# Patient Record
Sex: MALE | Race: WHITE | NOT HISPANIC OR LATINO | Employment: FULL TIME | URBAN - METROPOLITAN AREA
[De-identification: names, ages, dates, MRNs, and addresses within clinical notes are randomized per-mention and may not be internally consistent; named-entity substitution may affect disease eponyms.]

---

## 2023-03-13 ENCOUNTER — HOSPITAL ENCOUNTER (EMERGENCY)
Facility: HOSPITAL | Age: 43
Discharge: HOME | End: 2023-03-13
Attending: EMERGENCY MEDICINE

## 2023-03-13 VITALS
DIASTOLIC BLOOD PRESSURE: 102 MMHG | TEMPERATURE: 97.9 F | HEART RATE: 137 BPM | RESPIRATION RATE: 20 BRPM | SYSTOLIC BLOOD PRESSURE: 157 MMHG | OXYGEN SATURATION: 94 %

## 2023-03-13 DIAGNOSIS — R60.9 PERIPHERAL EDEMA: Primary | ICD-10-CM

## 2023-03-13 DIAGNOSIS — R00.0 TACHYCARDIA: ICD-10-CM

## 2023-03-13 DIAGNOSIS — R03.0 ELEVATED BLOOD PRESSURE READING: ICD-10-CM

## 2023-03-13 LAB
ALBUMIN SERPL BCP-MCNC: 4.3 G/DL (ref 3.5–5)
ALP SERPL-CCNC: 66 U/L (ref 46–116)
ALT SERPL W P-5'-P-CCNC: 39 U/L (ref 12–78)
ANION GAP SERPL CALCULATED.3IONS-SCNC: 3 MMOL/L (ref 4–13)
AST SERPL W P-5'-P-CCNC: 38 U/L (ref 5–45)
ATRIAL RATE: 99 BPM
BASOPHILS # BLD AUTO: 0.12 THOUSANDS/ÂΜL (ref 0–0.1)
BASOPHILS NFR BLD AUTO: 1 % (ref 0–1)
BILIRUB SERPL-MCNC: 0.96 MG/DL (ref 0.2–1)
BUN SERPL-MCNC: 6 MG/DL (ref 5–25)
CALCIUM SERPL-MCNC: 9.8 MG/DL (ref 8.3–10.1)
CARDIAC TROPONIN I PNL SERPL HS: 8 NG/L
CHLORIDE SERPL-SCNC: 102 MMOL/L (ref 96–108)
CK MB SERPL-MCNC: 1.1 % (ref 0–2.5)
CK MB SERPL-MCNC: 7 NG/ML (ref 0–5)
CK SERPL-CCNC: 659 U/L (ref 39–308)
CO2 SERPL-SCNC: 31 MMOL/L (ref 21–32)
CREAT SERPL-MCNC: 1.22 MG/DL (ref 0.6–1.3)
EOSINOPHIL # BLD AUTO: 0.3 THOUSAND/ÂΜL (ref 0–0.61)
EOSINOPHIL NFR BLD AUTO: 3 % (ref 0–6)
ERYTHROCYTE [DISTWIDTH] IN BLOOD BY AUTOMATED COUNT: 13.1 % (ref 11.6–15.1)
GFR SERPL CREATININE-BSD FRML MDRD: 73 ML/MIN/1.73SQ M
GLUCOSE SERPL-MCNC: 102 MG/DL (ref 65–140)
HCT VFR BLD AUTO: 48.8 % (ref 36.5–49.3)
HGB BLD-MCNC: 16.2 G/DL (ref 12–17)
IMM GRANULOCYTES # BLD AUTO: 0.01 THOUSAND/UL (ref 0–0.2)
IMM GRANULOCYTES NFR BLD AUTO: 0 % (ref 0–2)
LYMPHOCYTES # BLD AUTO: 2.62 THOUSANDS/ÂΜL (ref 0.6–4.47)
LYMPHOCYTES NFR BLD AUTO: 30 % (ref 14–44)
MCH RBC QN AUTO: 32 PG (ref 26.8–34.3)
MCHC RBC AUTO-ENTMCNC: 33.2 G/DL (ref 31.4–37.4)
MCV RBC AUTO: 96 FL (ref 82–98)
MONOCYTES # BLD AUTO: 0.66 THOUSAND/ÂΜL (ref 0.17–1.22)
MONOCYTES NFR BLD AUTO: 8 % (ref 4–12)
NEUTROPHILS # BLD AUTO: 5.11 THOUSANDS/ÂΜL (ref 1.85–7.62)
NEUTS SEG NFR BLD AUTO: 58 % (ref 43–75)
NRBC BLD AUTO-RTO: 0 /100 WBCS
NT-PROBNP SERPL-MCNC: 110 PG/ML
P AXIS: 50 DEGREES
PLATELET # BLD AUTO: 353 THOUSANDS/UL (ref 149–390)
PMV BLD AUTO: 9.7 FL (ref 8.9–12.7)
POTASSIUM SERPL-SCNC: 3.9 MMOL/L (ref 3.5–5.3)
PR INTERVAL: 124 MS
PROT SERPL-MCNC: 7.4 G/DL (ref 6.4–8.4)
QRS AXIS: 57 DEGREES
QRSD INTERVAL: 90 MS
QT INTERVAL: 324 MS
QTC INTERVAL: 415 MS
RBC # BLD AUTO: 5.07 MILLION/UL (ref 3.88–5.62)
SODIUM SERPL-SCNC: 136 MMOL/L (ref 135–147)
T WAVE AXIS: -7 DEGREES
TSH SERPL DL<=0.05 MIU/L-ACNC: 2.26 UIU/ML (ref 0.45–4.5)
VENTRICULAR RATE: 99 BPM
WBC # BLD AUTO: 8.82 THOUSAND/UL (ref 4.31–10.16)

## 2023-03-13 NOTE — ED PROVIDER NOTES
History  Chief Complaint   Patient presents with   • Leg Swelling   • Hand Swelling     Patient c/o b/l hand and lower leg swelling and redness for past 2 days  States he is on hormone replacement and thyroid medication that he hasn't been taking for a few weeks and just began taking them again  Denies CP/SOB  45-year-old male patient with history of hypothyroid, decreased testosterone, presenting with bilateral hand and bilateral lower extremity swelling onset 2 days ago  Patient also has redness in his fingers  States has been mildly worsening  Denies pain to hands and legs  Patient was recently started on levothyroxine for his hypothyroidism  Patient also having palpitations today and chest pain yesterday  Patient states that he was recently started on psychiatric medications  Denies any fevers, abdominal pain, nausea, vomiting, diarrhea, urinary symptoms  Prior to Admission Medications   Prescriptions Last Dose Informant Patient Reported? Taking? ALPRAZolam (XANAX) 2 MG tablet   Yes No   Sig: Take 2 mg by mouth daily at bedtime as needed for anxiety   Amphetamine-Dextroamphetamine (ADDERALL PO)   Yes No   Sig: Take by mouth   TESTOSTERONE IM   Yes No   Sig: Inject into the shoulder, thigh, or buttocks      Facility-Administered Medications: None       History reviewed  No pertinent past medical history  History reviewed  No pertinent surgical history  History reviewed  No pertinent family history  I have reviewed and agree with the history as documented  E-Cigarette/Vaping     E-Cigarette/Vaping Substances     Social History     Tobacco Use   • Smoking status: Never   Substance Use Topics   • Alcohol use: No   • Drug use: No        Review of Systems   Cardiovascular: Positive for chest pain, palpitations and leg swelling (Bilateral lower extremity swelling, upper extremity swelling)  All other systems reviewed and are negative        Physical Exam  ED Triage Vitals   Temperature Pulse Respirations Blood Pressure SpO2   03/13/23 0242 03/13/23 0240 03/13/23 0240 03/13/23 0240 03/13/23 0240   97 9 °F (36 6 °C) (!) 137 20 (!) 157/102 94 %      Temp Source Heart Rate Source Patient Position - Orthostatic VS BP Location FiO2 (%)   03/13/23 0242 03/13/23 0240 -- -- --   Oral Monitor         Pain Score       03/13/23 0240       No Pain             Orthostatic Vital Signs  Vitals:    03/13/23 0240   BP: (!) 157/102   Pulse: (!) 137       Physical Exam  Vitals reviewed  Constitutional:       Appearance: Normal appearance  HENT:      Head: Normocephalic and atraumatic  Nose: Nose normal       Mouth/Throat:      Mouth: Mucous membranes are moist       Pharynx: Oropharynx is clear  Eyes:      Extraocular Movements: Extraocular movements intact  Conjunctiva/sclera: Conjunctivae normal    Cardiovascular:      Rate and Rhythm: Regular rhythm  Tachycardia present  Pulses: Normal pulses  Heart sounds: Normal heart sounds  Pulmonary:      Effort: Pulmonary effort is normal       Breath sounds: Normal breath sounds  Abdominal:      General: Bowel sounds are normal       Palpations: Abdomen is soft  Tenderness: There is no abdominal tenderness  Musculoskeletal:         General: Normal range of motion  Cervical back: Normal range of motion  Right lower leg: Edema (2+ pitting edema) present  Left lower leg: Edema (2+ pitting edema) present  Comments: Mild swelling to bilateral upper extremities with some erythema to the distal tips inferior to fingernails   Skin:     General: Skin is warm and dry  Neurological:      General: No focal deficit present  Mental Status: He is alert and oriented to person, place, and time  Mental status is at baseline           ED Medications  Medications - No data to display    Diagnostic Studies  Results Reviewed     Procedure Component Value Units Date/Time    TSH, 3rd generation with Free T4 reflex [[id number]]  (Normal) Collected: 03/13/23 0425    Lab Status: Final result Specimen: Blood from Arm, Right Updated: 03/13/23 0553     TSH 3RD GENERATON 2 260 uIU/mL     Narrative:      Patients undergoing fluorescein dye angiography may retain small amounts of fluorescein in the body for 48-72 hours post procedure  Samples containing fluorescein can produce falsely depressed TSH values  If the patient had this procedure,a specimen should be resubmitted post fluorescein clearance        NT-BNP PRO-BE,[facility] only           [[id number]]  (Normal) Collected: 03/13/23 0425    Lab Status: Final result Specimen: Blood from Arm, Right Updated: 03/13/23 0553     NT-proBNP 110 pg/mL     CKMB [[id number]]  (Abnormal) Collected: 03/13/23 0425    Lab Status: Final result Specimen: Blood from Arm, Right Updated: 03/13/23 0525     CK-MB Index 1 1 %      CK-MB 7 0 ng/mL     HS Troponin 0hr (reflex protocol) [[id number]]  (Normal) Collected: 03/13/23 0425    Lab Status: Final result Specimen: Blood from Arm, Right Updated: 03/13/23 0510     hs TnI 0hr 8 ng/L     CK (with reflex to MB) [[id number]]  (Abnormal) Collected: 03/13/23 0425    Lab Status: Final result Specimen: Blood from Arm, Right Updated: 03/13/23 0509     Total  U/L     Comprehensive metabolic panel [[id number]]  (Abnormal) Collected: 03/13/23 0425    Lab Status: Final result Specimen: Blood from Arm, Right Updated: 03/13/23 0504     Sodium 136 mmol/L      Potassium 3 9 mmol/L      Chloride 102 mmol/L      CO2 31 mmol/L      ANION GAP 3 mmol/L      BUN 6 mg/dL      Creatinine 1 22 mg/dL      Glucose 102 mg/dL      Calcium 9 8 mg/dL      AST 38 U/L      ALT 39 U/L      Alkaline Phosphatase 66 U/L      Total Protein 7 4 g/dL      Albumin 4 3 g/dL      Total Bilirubin 0 96 mg/dL      eGFR 73 ml/min/1 73sq m     Narrative:      [place] guidelines for Chronic Kidney Disease (CKD):   •  Stage 1 with normal or high GFR (GFR > 90 mL/min/1 73 square meters)  •  Stage 2 Mild CKD (GFR = 60-89 mL/min/1 73 square meters)  •  Stage 3A Moderate CKD (GFR = 45-59 mL/min/1 73 square meters)  •  Stage 3B Moderate CKD (GFR = 30-44 mL/min/1 73 square meters)  •  Stage 4 Severe CKD (GFR = 15-29 mL/min/1 73 square meters)  •  Stage 5 End Stage CKD (GFR <15 mL/min/1 73 square meters)  Note: GFR calculation is accurate only with a steady state creatinine    CBC and differential [[id number]]  (Abnormal) Collected: 03/13/23 0425    Lab Status: Final result Specimen: Blood from Arm, Right Updated: 03/13/23 0439     WBC 8 82 Thousand/uL      RBC 5 07 Million/uL      Hemoglobin 16 2 g/dL      Hematocrit 48 8 %      MCV 96 fL      MCH 32 0 pg      MCHC 33 2 g/dL      RDW 13 1 %      MPV 9 7 fL      Platelets 293 Thousands/uL      nRBC 0 /100 WBCs      Neutrophils Relative 58 %      Immat GRANS % 0 %      Lymphocytes Relative 30 %      Monocytes Relative 8 %      Eosinophils Relative 3 %      Basophils Relative 1 %      Neutrophils Absolute 5 11 Thousands/µL      Immature Grans Absolute 0 01 Thousand/uL      Lymphocytes Absolute 2 62 Thousands/µL      Monocytes Absolute 0 66 Thousand/µL      Eosinophils Absolute 0 30 Thousand/µL      Basophils Absolute 0 12 Thousands/µL                  No orders to display         Procedures  Procedures      ED Course  ED Course as of 03/14/23 1755   Mon Mar 13, 2023   3519 EKG: normal sinus rhythm  Twave inversion in lead III                                         Medical Decision Making  41 y/o male patient presenting with BLE swelling and BUE swelling  Patien thas some erythema to distal digits  Patient also has episode of palpitations and chest pain yesterday  Exam shows edema to BLE and BUE  Tachycardia  Possible ddx includes thyroid abnormality, medication reaction, idiopathic, myositis  Labs WNL, CK midly elevated  Patient states he recently worked out  ekg shows tachycardia       Elevated blood pressure reading:     Details: Joy has elevated BP  follow up with PCP  Peripheral edema:     Details: Patient c/o peripheral edema  unknown etiology  labs show mild elevated CK  Tachycardia:     Details: patient with tachycardia, improved in ED  Amount and/or Complexity of Data Reviewed  Labs: ordered  ECG/medicine tests: independent interpretation performed  Details: NSR  NSST changes  Discussion of management or test interpretation with external provider(s): Discussed results with patient  Stable for discharge with follow up with PCP  Return precautions given  Disposition  Final diagnoses:   Peripheral edema   Tachycardia   Elevated blood pressure reading     Time reflects when diagnosis was documented in both MDM as applicable and the Disposition within this note     Time User Action Codes Description Comment    3/13/2023  6:05 AM Rebecca Stair Seok Add [R60 0] Bilateral lower extremity edema     3/13/2023  6:05 AM Rebecca Stair Seok Add [R60 0] Extremity edema     3/13/2023  6:05 AM Rebecca Stair Seok Modify [R60 0] Extremity edema     3/13/2023  6:05 AM Melany Helmili, Skip Grey Seok Remove [R60 0] Bilateral lower extremity edema     3/13/2023  6:05 AM Melany Helmili, Min Seok Remove [R60 0] Extremity edema     3/13/2023  6:05 AM Rebecca Stair Seok Add [R60 9] Peripheral edema     3/13/2023  6:05 AM Melany Krista Skip Grey Seok Add [R00 0] Tachycardia     3/13/2023  6:05 AM Rebecca Stair Seok Add [R03 0] Elevated blood pressure reading       ED Disposition     ED Disposition   Discharge    Condition   Stable    Date/Time   Mon Mar 13, 2023  6:05 AM    Comment   Kingsley Garcia discharge to home/self care                 Follow-up Information    None         Discharge Medication List as of 3/13/2023  6:06 AM      CONTINUE these medications which have NOT CHANGED    Details   ALPRAZolam (XANAX) 2 MG tablet Take 2 mg by mouth daily at bedtime as needed for anxiety, Until Discontinued, Historical Med      Amphetamine-Dextroamphetamine (ADDERALL PO) Take by mouth, Until Discontinued, Historical Med TESTOSTERONE IM Inject into the shoulder, thigh, or buttocks, Until Discontinued, Historical Med           No discharge procedures on file  PDMP Review     None           ED Provider  Attending physically available and evaluated Tomi Tomas I managed the patient along with the ED Attending      Electronically Signed by         Jason Rodriguez MD  03/14/23 6354

## 2023-03-13 NOTE — ED ATTENDING ATTESTATION
Final Diagnoses:     1  Peripheral edema    2  Tachycardia    3  Elevated blood pressure reading      ED Course as of 03/13/23 0619   Mon Mar 13, 2023   0619 Total CK(!): 659   0619 Creatinine: 1 22       I, Kelley Mcknight MD, saw and evaluated the patient  All available labs and X-rays were ordered by me or the resident and have been reviewed by myself  I discussed the patient with the resident / non-physician and agree with the resident's / non-physician practitioner's findings and plan as documented in the resident's / non-physician practicitioner's note, except where noted  At this point, I agree with the current assessment done in the ED  I was present during key portions of all procedures performed unless otherwise stated  Nursing Triage:     Chief Complaint   Patient presents with   • Leg Swelling   • Hand Swelling     Patient c/o b/l hand and lower leg swelling and redness for past 2 days  States he is on hormone replacement and thyroid medication that he hasn't been taking for a few weeks and just began taking them again  Denies CP/SOB  HPI:   This is a 40 y o  male presenting for evaluation of 2 days of bilateral hand and leg selling  Erythema of the LEFT hand mostly under the nails  7 seconds of palpitations with CP yesterday  Hx of hypothyroidism  When younger he used steroids; he doesn't produce Testosterone as well  Takes anti-estrogen medications  Takes adderall, gabapentin  Mild pain in the legs  ASSESSMENT + PLAN:   Will do fluids, labs (CMP for electrolytes, kidney function, looking for transaminitis)  Will check CPK (steroid induced myositis?)  Troponin, EKG, BNP given the transient palpitations, CP  Recent started on levothyroixine 2 weeks ago ? TSH w/ reflex  Physical:   General: VS reviewed  Appears in NAD  awake, alert  Well-nourished, well-developed  Appears stated age  Speaking normally in full sentences     Head: Normocephalic, atraumatic  Eyes: EOM-I  No diplopia  No hyphema  No subconjunctival hemorrhages  Symmetrical lids  ENT: Atraumatic external nose and ears  MMM  No malocclusion  No stridor  Normal phonation  No drooling  Normal swallowing  Neck: No JVD  CV: No pallor noted  Tachycardia HR 130s  Lungs:   No tachypnea  No respiratory distress  Abd: soft nt nd no rebound/guarding  MSK:   FROM spontaneously  Swelling of hte arms and legs, pitting  Skin: Dry, intact  Neuro: Awake, alert, GCS15, CN II-XII grossly intact  Motor grossly intact  Psychiatric/Behavioral: interacting normally; appropriate mood/affect   Exam: deferred    Vitals:    03/13/23 0240 03/13/23 0242   BP: (!) 157/102    Pulse: (!) 137    Resp: 20    Temp:  97 9 °F (36 6 °C)   TempSrc:  Oral   SpO2: 94%      - There are no obvious limitations to social determinants of care  - Nursing note reviewed  - Vitals reviewed  - Orders placed by myself and/or advanced practitioner / resident     - Previous chart was reviewed  - No language barrier    - History obtained from patient  - There are no limitations to the history obtained:     Past Medical:    has no past medical history on file  Past Surgical:    has no past surgical history on file  Social:     Social History     Substance and Sexual Activity   Alcohol Use No     Social History     Tobacco Use   Smoking Status Never   Smokeless Tobacco Not on file     Social History     Substance and Sexual Activity   Drug Use No       Echo:   No results found for this or any previous visit  No results found for this or any previous visit  Cath:    No results found for this or any previous visit        Code Status: No Order  Advance Directive and Living Will:      Power of :    POLST:    Medications - No data to display  No orders to display     Orders Placed This Encounter   Procedures   • CBC and differential   • Comprehensive metabolic panel   • HS Troponin 0hr (reflex protocol)   • TSH, 3rd generation with Free T4 reflex   • CK (with reflex to MB)   • CKMB   • HS Troponin I 2hr   • HS Troponin I 4hr   • NT-BNP PRO-BE,[facility] only             • ECG 12 lead   • ECG 12 lead     Labs Reviewed   CBC AND DIFFERENTIAL - Abnormal       Result Value Ref Range Status    WBC 8 82  4 31 - 10 16 Thousand/uL Final    RBC 5 07  3 88 - 5 62 Million/uL Final    Hemoglobin 16 2  12 0 - 17 0 g/dL Final    Hematocrit 48 8  36 5 - 49 3 % Final    MCV 96  82 - 98 fL Final    MCH 32 0  26 8 - 34 3 pg Final    MCHC 33 2  31 4 - 37 4 g/dL Final    RDW 13 1  11 6 - 15 1 % Final    MPV 9 7  8 9 - 12 7 fL Final    Platelets 388  389 - 390 Thousands/uL Final    nRBC 0  /100 WBCs Final    Neutrophils Relative 58  43 - 75 % Final    Immat GRANS % 0  0 - 2 % Final    Lymphocytes Relative 30  14 - 44 % Final    Monocytes Relative 8  4 - 12 % Final    Eosinophils Relative 3  0 - 6 % Final    Basophils Relative 1  0 - 1 % Final    Neutrophils Absolute 5 11  1 85 - 7 62 Thousands/µL Final    Immature Grans Absolute 0 01  0 00 - 0 20 Thousand/uL Final    Lymphocytes Absolute 2 62  0 60 - 4 47 Thousands/µL Final    Monocytes Absolute 0 66  0 17 - 1 22 Thousand/µL Final    Eosinophils Absolute 0 30  0 00 - 0 61 Thousand/µL Final    Basophils Absolute 0 12 (*) 0 00 - 0 10 Thousands/µL Final   COMPREHENSIVE METABOLIC PANEL - Abnormal    Sodium 136  135 - 147 mmol/L Final    Potassium 3 9  3 5 - 5 3 mmol/L Final    Chloride 102  96 - 108 mmol/L Final    CO2 31  21 - 32 mmol/L Final    ANION GAP 3 (*) 4 - 13 mmol/L Final    BUN 6  5 - 25 mg/dL Final    Creatinine 1 22  0 60 - 1 30 mg/dL Final    Comment: Standardized to IDMS reference method    Glucose 102  65 - 140 mg/dL Final    Comment: If the patient is fasting, the ADA then defines impaired fasting glucose as > 100 mg/dL and diabetes as > or equal to 123 mg/dL  Specimen collection should occur prior to Sulfasalazine administration due to the potential for falsely depressed results  Specimen collection should occur prior to Sulfapyridine administration due to the potential for falsely elevated results  Calcium 9 8  8 3 - 10 1 mg/dL Final    AST 38  5 - 45 U/L Final    Comment: Specimen collection should occur prior to Sulfasalazine administration due to the potential for falsely depressed results  ALT 39  12 - 78 U/L Final    Comment: Specimen collection should occur prior to Sulfasalazine and/or Sulfapyridine administration due to the potential for falsely depressed results  Alkaline Phosphatase 66  46 - 116 U/L Final    Total Protein 7 4  6 4 - 8 4 g/dL Final    Albumin 4 3  3 5 - 5 0 g/dL Final    Total Bilirubin 0 96  0 20 - 1 00 mg/dL Final    Comment: Use of this assay is not recommended for patients undergoing treatment with eltrombopag due to the potential for falsely elevated results  eGFR 73  ml/min/1 73sq m Final    Narrative:     [place] guidelines for Chronic Kidney Disease (CKD):   •  Stage 1 with normal or high GFR (GFR > 90 mL/min/1 73 square meters)  •  Stage 2 Mild CKD (GFR = 60-89 mL/min/1 73 square meters)  •  Stage 3A Moderate CKD (GFR = 45-59 mL/min/1 73 square meters)  •  Stage 3B Moderate CKD (GFR = 30-44 mL/min/1 73 square meters)  •  Stage 4 Severe CKD (GFR = 15-29 mL/min/1 73 square meters)  •  Stage 5 End Stage CKD (GFR <15 mL/min/1 73 square meters)  Note: GFR calculation is accurate only with a steady state creatinine   CK - Abnormal    Total  (*) 39 - 308 U/L Final   CKMB - Abnormal    CK-MB Index 1 1  0 0 - 2 5 % Final    CK-MB 7 0 (*) 0 0 - 5 0 ng/mL Final   HS TROPONIN I 0HR - Normal    hs TnI 0hr 8  "Refer to ACS Flowchart"- see link ng/L Final    Comment:                                              Initial (time 0) result  If >=50 ng/L, Myocardial injury suggested ;  Type of myocardial injury and treatment strategy  to be determined    If 5-49 ng/L, a delta result at 2 hours and or 4 hours will be needed to further evaluate  If <4 ng/L, and chest pain has been >3 hours since onset, patient may qualify for discharge based on the HEART score in the ED  If <5 ng/L and <3hours since onset of chest pain, a delta result at 2 hours will be needed to further evaluate  HS Troponin 99th Percentile URL of a Health Population=12 ng/L with a 95% Confidence Interval of 8-18 ng/L  Second Troponin (time 2 hours)  If calculated delta >= 20 ng/L,  Myocardial injury suggested ; Type of myocardial injury and treatment strategy to be determined  If 5-49 ng/L and the calculated delta is 5-19 ng/L, consult medical service for evaluation  Continue evaluation for ischemia on ecg and other possible etiology and repeat hs troponin at 4 hours  If delta is <5 ng/L at 2 hours, consider discharge based on risk stratification via the HEART score (if in ED), or ELAINE risk score in IP/Observation  HS Troponin 99th Percentile URL of a Health Population=12 ng/L with a 95% Confidence Interval of 8-18 ng/L    TSH, 3RD GENERATION WITH FREE T4 REFLEX - Normal    TSH 3RD [facility] 2 260  0 450 - 4 500 uIU/mL Final    Comment: Adult TSH (3rd generation) reference range follows the recommended guidelines of the [employer] Association, January, 2020  Narrative:     Patients undergoing fluorescein dye angiography may retain small amounts of fluorescein in the body for 48-72 hours post procedure  Samples containing fluorescein can produce falsely depressed TSH values  If the patient had this procedure,a specimen should be resubmitted post fluorescein clearance       NT-BNP PRO-BE,[facility] ONLY - Normal    NT-proBNP 110  <125 pg/mL Final   HS TROPONIN I 2HR     Time reflects when diagnosis was documented in both MDM as applicable and the Disposition within this note     Time User Action Codes Description Comment    3/13/2023  6:05 AM Master Henry Add [R60 0] Bilateral lower extremity edema     3/13/2023  6:05 AM Master Henry Add [R60 0] Extremity edema     3/13/2023  6:05 AM Early Kehr [facility] Modify [R60 0] Extremity edema     3/13/2023  6:05 AM Early Kequinn Seok Remove [R60 0] Bilateral lower extremity edema     3/13/2023  6:05 AM Hever Gregorio Seok Remove [R60 0] Extremity edema     3/13/2023  6:05 AM Early Kequinn Seok Add [R60 9] Peripheral edema     3/13/2023  6:05 AM Bal Gregorio Seok Add [R00 0] Tachycardia     3/13/2023  6:05 AM Early Kequinn Seok Add [R03 0] Elevated blood pressure reading       ED Disposition     ED Disposition   Discharge    Condition   Stable    Date/Time   Mon Mar 13, 2023  6:05 AM    Kumar Leon discharge to home/self care  Follow-up Information    None       Patient's Medications   Discharge Prescriptions    No medications on file     No discharge procedures on file  Prior to Admission Medications   Prescriptions Last Dose Informant Patient Reported? Taking? ALPRAZolam (XANAX) 2 MG tablet   Yes No   Sig: Take 2 mg by mouth daily at bedtime as needed for anxiety   Amphetamine-Dextroamphetamine (ADDERALL PO)   Yes No   Sig: Take by mouth   TESTOSTERONE IM   Yes No   Sig: Inject into the shoulder, thigh, or buttocks      Facility-Administered Medications: None                        Portions of the record may have been created with voice recognition software  Occasional wrong word or "sound a like" substitutions may have occurred due to the inherent limitations of voice recognition software  Read the chart carefully and recognize, using context, where substitutions have occurred      Electronically signed by:  Julianna Velásquez

## 2023-03-13 NOTE — DISCHARGE INSTRUCTIONS
You were seen in the ED for edema and tachycardia  Return to the ED for any worsening symptoms or new symptoms  Follow up with your primary care doctor as soon as possible